# Patient Record
Sex: MALE | Race: WHITE | NOT HISPANIC OR LATINO | URBAN - NONMETROPOLITAN AREA
[De-identification: names, ages, dates, MRNs, and addresses within clinical notes are randomized per-mention and may not be internally consistent; named-entity substitution may affect disease eponyms.]

---

## 2019-06-27 ENCOUNTER — OFFICE (OUTPATIENT)
Dept: URBAN - NONMETROPOLITAN AREA CLINIC 13 | Facility: CLINIC | Age: 59
End: 2019-06-27
Payer: COMMERCIAL

## 2019-06-27 VITALS
DIASTOLIC BLOOD PRESSURE: 81 MMHG | SYSTOLIC BLOOD PRESSURE: 130 MMHG | HEART RATE: 56 BPM | HEIGHT: 76 IN | WEIGHT: 147 LBS | OXYGEN SATURATION: 98 %

## 2019-06-27 VITALS — HEIGHT: 76 IN

## 2019-06-27 DIAGNOSIS — R13.10 DYSPHAGIA, UNSPECIFIED: ICD-10-CM

## 2019-06-27 DIAGNOSIS — R10.13 EPIGASTRIC PAIN: ICD-10-CM

## 2019-06-27 DIAGNOSIS — R19.7 DIARRHEA, UNSPECIFIED: ICD-10-CM

## 2019-06-27 DIAGNOSIS — Z85.038 PERSONAL HISTORY OF OTHER MALIGNANT NEOPLASM OF LARGE INTEST: ICD-10-CM

## 2019-06-27 LAB
AMYLASE: 70 U/L (ref 25–125)
CBC: HCT: 47 % (ref 37.5–51)
CBC: HGB: 16.1 G/DL (ref 13–17.7)
CBC: LYMPH. ABSOLUTE: 1.9 10 (ref 1.3–2.9)
CBC: LYMPHOCYTES: 24.6 % (ref 20.5–40.5)
CBC: MCH: 34.3 PG — HIGH (ref 26.6–33)
CBC: MCHC: 34.3 G/DL (ref 31.5–35.7)
CBC: MCV: 100 FL — HIGH (ref 79–97)
CBC: MONO. ABSOLUTE: 0.8 10 (ref 0.3–3.8)
CBC: MONOCYTES: 9.9 % — HIGH (ref 0.3–3.8)
CBC: NEUT. ABSOLUTE: 4.9 10 — HIGH (ref 2.2–4.8)
CBC: NEUTROPHILS: 65.5 % (ref 43–67)
CBC: PLATELET: 161 10 (ref 130–440)
CBC: RBC: 4.7 10 (ref 4.14–5.8)
CBC: RDW: 13.6 % (ref 11.5–14.5)
CBC: WBC: 7.6 10 (ref 3.4–10.8)
CMP: ALBUMIN: 4.8 G/DL (ref 3.5–5.5)
CMP: ALK.PHOS.: 83 U/L (ref 33–130)
CMP: ALT: 10 U/L (ref 9–55)
CMP: AST: 18 U/L (ref 9–40)
CMP: BUN: 20 MG/DL (ref 7–26)
CMP: CALCIUM: 10 MG/DL (ref 8.4–10.3)
CMP: CHLORIDE: 102 MMOL/L (ref 96–106)
CMP: CO2: 31 MEQ/L — HIGH (ref 20–29)
CMP: CREATININE: 1.02 MG/DL (ref 0.57–1.25)
CMP: GFR AFR. AMER.: 96.1 ML/MIN/1.73 (ref 60–?)
CMP: GFR NON AFR. AMER.: 79.5 ML/MIN/1.73 (ref 60–?)
CMP: GLUCOSE: 104 MG/DL — HIGH (ref 65–99)
CMP: POTASSIUM: 5.2 MMOL/L (ref 3.5–5.3)
CMP: SODIUM: 141 MMOL/L (ref 134–144)
CMP: TOTAL BILIRUBIN: 0.6 MG/DL (ref 0.3–1.2)
CMP: TOTAL PROTEIN: 7.5 G/DL (ref 6–8.5)
LIPASE: 27 U/L (ref 8–78)

## 2019-06-27 PROCEDURE — 83690 ASSAY OF LIPASE: CPT | Performed by: NURSE PRACTITIONER

## 2019-06-27 PROCEDURE — 82150 ASSAY OF AMYLASE: CPT | Performed by: NURSE PRACTITIONER

## 2019-06-27 PROCEDURE — 85025 COMPLETE CBC W/AUTO DIFF WBC: CPT | Performed by: NURSE PRACTITIONER

## 2019-06-27 PROCEDURE — 99205 OFFICE O/P NEW HI 60 MIN: CPT | Performed by: NURSE PRACTITIONER

## 2019-06-27 PROCEDURE — 80053 COMPREHEN METABOLIC PANEL: CPT | Performed by: NURSE PRACTITIONER

## 2019-06-27 RX ORDER — POLYETHYLENE GLYCOL 3350, SODIUM SULFATE ANHYDROUS, SODIUM BICARBONATE, SODIUM CHLORIDE, POTASSIUM CHLORIDE 236; 22.74; 6.74; 5.86; 2.97 G/4L; G/4L; G/4L; G/4L; G/4L
POWDER, FOR SOLUTION ORAL
Qty: 1 | Refills: 0 | Status: COMPLETED
Start: 2019-06-27 | End: 2019-07-01

## 2019-06-27 RX ORDER — ONDANSETRON HYDROCHLORIDE 4 MG/1
TABLET, FILM COATED ORAL
Qty: 2 | Refills: 0 | Status: COMPLETED
Start: 2019-06-27 | End: 2019-07-01

## 2019-06-27 RX ORDER — BISACODYL 5 MG
TABLET, DELAYED RELEASE (ENTERIC COATED) ORAL
Qty: 8 | Refills: 0 | Status: COMPLETED
Start: 2019-06-27 | End: 2019-07-01

## 2019-06-28 ENCOUNTER — AMBULATORY SURGICAL CENTER (OUTPATIENT)
Dept: URBAN - NONMETROPOLITAN AREA SURGERY 2 | Facility: SURGERY | Age: 59
End: 2019-06-28
Payer: COMMERCIAL

## 2019-06-28 ENCOUNTER — OFFICE (OUTPATIENT)
Dept: URBAN - NONMETROPOLITAN AREA CLINIC 13 | Facility: CLINIC | Age: 59
End: 2019-06-28
Payer: COMMERCIAL

## 2019-06-28 VITALS
SYSTOLIC BLOOD PRESSURE: 145 MMHG | HEART RATE: 49 BPM | DIASTOLIC BLOOD PRESSURE: 91 MMHG | OXYGEN SATURATION: 99 % | HEART RATE: 61 BPM | RESPIRATION RATE: 18 BRPM | HEART RATE: 59 BPM | DIASTOLIC BLOOD PRESSURE: 94 MMHG | DIASTOLIC BLOOD PRESSURE: 78 MMHG | DIASTOLIC BLOOD PRESSURE: 84 MMHG | HEART RATE: 58 BPM | OXYGEN SATURATION: 100 % | HEIGHT: 76 IN | WEIGHT: 147 LBS | OXYGEN SATURATION: 97 % | SYSTOLIC BLOOD PRESSURE: 148 MMHG | SYSTOLIC BLOOD PRESSURE: 131 MMHG | DIASTOLIC BLOOD PRESSURE: 77 MMHG | RESPIRATION RATE: 20 BRPM | HEART RATE: 54 BPM | SYSTOLIC BLOOD PRESSURE: 137 MMHG | DIASTOLIC BLOOD PRESSURE: 93 MMHG | DIASTOLIC BLOOD PRESSURE: 86 MMHG | SYSTOLIC BLOOD PRESSURE: 135 MMHG | SYSTOLIC BLOOD PRESSURE: 139 MMHG | HEART RATE: 63 BPM | DIASTOLIC BLOOD PRESSURE: 98 MMHG

## 2019-06-28 DIAGNOSIS — R13.10 DYSPHAGIA, UNSPECIFIED: ICD-10-CM

## 2019-06-28 DIAGNOSIS — K21.9 GASTRO-ESOPHAGEAL REFLUX DISEASE WITHOUT ESOPHAGITIS: ICD-10-CM

## 2019-06-28 DIAGNOSIS — K31.89 OTHER DISEASES OF STOMACH AND DUODENUM: ICD-10-CM

## 2019-06-28 DIAGNOSIS — K30 FUNCTIONAL DYSPEPSIA: ICD-10-CM

## 2019-06-28 DIAGNOSIS — K29.70 GASTRITIS, UNSPECIFIED, WITHOUT BLEEDING: ICD-10-CM

## 2019-06-28 DIAGNOSIS — K31.9 DISEASE OF STOMACH AND DUODENUM, UNSPECIFIED: ICD-10-CM

## 2019-06-28 PROCEDURE — 43239 EGD BIOPSY SINGLE/MULTIPLE: CPT | Mod: 59 | Performed by: INTERNAL MEDICINE

## 2019-06-28 PROCEDURE — 00731 ANES UPR GI NDSC PX NOS: CPT | Mod: QZ,QS

## 2019-06-28 PROCEDURE — 88305 TISSUE EXAM BY PATHOLOGIST: CPT | Mod: TC | Performed by: INTERNAL MEDICINE

## 2019-06-28 PROCEDURE — 00731 ANES UPR GI NDSC PX NOS: CPT | Mod: QS,QZ

## 2019-06-28 PROCEDURE — 43248 EGD GUIDE WIRE INSERTION: CPT | Performed by: INTERNAL MEDICINE

## 2019-06-28 RX ORDER — OMEPRAZOLE 40 MG/1
CAPSULE, DELAYED RELEASE PELLETS ORAL
Qty: 90 | Refills: 1 | Status: COMPLETED
Start: 2019-06-28 | End: 2019-08-30

## 2019-07-01 ENCOUNTER — OFFICE (OUTPATIENT)
Dept: URBAN - NONMETROPOLITAN AREA CLINIC 13 | Facility: CLINIC | Age: 59
End: 2019-07-01
Payer: COMMERCIAL

## 2019-07-01 ENCOUNTER — AMBULATORY SURGICAL CENTER (OUTPATIENT)
Dept: URBAN - NONMETROPOLITAN AREA SURGERY 2 | Facility: SURGERY | Age: 59
End: 2019-07-01
Payer: COMMERCIAL

## 2019-07-01 VITALS
DIASTOLIC BLOOD PRESSURE: 64 MMHG | RESPIRATION RATE: 16 BRPM | OXYGEN SATURATION: 100 % | OXYGEN SATURATION: 94 % | OXYGEN SATURATION: 97 % | WEIGHT: 147 LBS | HEART RATE: 65 BPM | SYSTOLIC BLOOD PRESSURE: 89 MMHG | HEART RATE: 64 BPM | DIASTOLIC BLOOD PRESSURE: 61 MMHG | SYSTOLIC BLOOD PRESSURE: 122 MMHG | SYSTOLIC BLOOD PRESSURE: 141 MMHG | SYSTOLIC BLOOD PRESSURE: 112 MMHG | HEART RATE: 78 BPM | DIASTOLIC BLOOD PRESSURE: 81 MMHG | SYSTOLIC BLOOD PRESSURE: 98 MMHG | DIASTOLIC BLOOD PRESSURE: 80 MMHG | HEART RATE: 69 BPM | DIASTOLIC BLOOD PRESSURE: 83 MMHG | OXYGEN SATURATION: 96 % | RESPIRATION RATE: 18 BRPM | SYSTOLIC BLOOD PRESSURE: 87 MMHG | OXYGEN SATURATION: 95 % | HEART RATE: 79 BPM | HEIGHT: 76 IN | HEART RATE: 76 BPM

## 2019-07-01 VITALS — HEIGHT: 76 IN

## 2019-07-01 DIAGNOSIS — Z85.038 PERSONAL HISTORY OF OTHER MALIGNANT NEOPLASM OF LARGE INTEST: ICD-10-CM

## 2019-07-01 DIAGNOSIS — R97.0 ELEVATED CARCINOEMBRYONIC ANTIGEN [CEA]: ICD-10-CM

## 2019-07-01 PROBLEM — R10.32: Status: ACTIVE | Noted: 2019-07-01

## 2019-07-01 PROCEDURE — 74177 CT ABD & PELVIS W/CONTRAST: CPT | Mod: TC | Performed by: INTERNAL MEDICINE

## 2019-07-01 PROCEDURE — 71260 CT THORAX DX C+: CPT | Mod: TC | Performed by: INTERNAL MEDICINE

## 2019-07-01 PROCEDURE — G0105 COLORECTAL SCRN; HI RISK IND: HCPCS | Performed by: INTERNAL MEDICINE

## 2019-07-02 LAB — SURGICAL PROCEDURE: PDFREPORT1: (no result)

## 2019-08-30 ENCOUNTER — OFFICE (OUTPATIENT)
Dept: URBAN - NONMETROPOLITAN AREA CLINIC 13 | Facility: CLINIC | Age: 59
End: 2019-08-30
Payer: COMMERCIAL

## 2019-08-30 VITALS
SYSTOLIC BLOOD PRESSURE: 170 MMHG | RESPIRATION RATE: 18 BRPM | DIASTOLIC BLOOD PRESSURE: 112 MMHG | HEIGHT: 76 IN | WEIGHT: 141 LBS | HEART RATE: 72 BPM | SYSTOLIC BLOOD PRESSURE: 157 MMHG | OXYGEN SATURATION: 98 % | DIASTOLIC BLOOD PRESSURE: 118 MMHG

## 2019-08-30 VITALS — HEIGHT: 76 IN

## 2019-08-30 DIAGNOSIS — Z85.038 PERSONAL HISTORY OF OTHER MALIGNANT NEOPLASM OF LARGE INTEST: ICD-10-CM

## 2019-08-30 DIAGNOSIS — R10.33 PERIUMBILICAL PAIN: ICD-10-CM

## 2019-08-30 DIAGNOSIS — R11.2 NAUSEA WITH VOMITING, UNSPECIFIED: ICD-10-CM

## 2019-08-30 DIAGNOSIS — R19.7 DIARRHEA, UNSPECIFIED: ICD-10-CM

## 2019-08-30 DIAGNOSIS — R10.9 UNSPECIFIED ABDOMINAL PAIN: ICD-10-CM

## 2019-08-30 LAB
AMYLASE: 65 U/L (ref 25–125)
CBC: HCT: 42.9 % (ref 37.5–51)
CBC: HGB: 15 G/DL (ref 13–17.7)
CBC: LYMPH. ABSOLUTE: 2.2 10 (ref 1.3–2.9)
CBC: LYMPHOCYTES: 22.9 % (ref 20.5–40.5)
CBC: MCH: 34.1 PG — HIGH (ref 26.6–33)
CBC: MCHC: 35 G/DL (ref 31.5–35.7)
CBC: MCV: 97.5 FL — HIGH (ref 79–97)
CBC: MONO. ABSOLUTE: 0.8 10 (ref 0.3–3.8)
CBC: MONOCYTES: 8.4 % — HIGH (ref 0.3–3.8)
CBC: NEUT. ABSOLUTE: 6.7 10 — HIGH (ref 2.2–4.8)
CBC: NEUTROPHILS: 68.7 % — HIGH (ref 43–67)
CBC: PLATELET: 158 10 (ref 130–440)
CBC: RBC: 4.4 10 (ref 4.14–5.8)
CBC: RDW: 12.8 % (ref 11.5–15.4)
CBC: WBC: 9.7 10 (ref 3.4–10.8)
CMP: ALBUMIN: 4.8 G/DL (ref 3.5–5.5)
CMP: ALK.PHOS.: 79 U/L (ref 33–130)
CMP: ALT: 15 U/L (ref 9–55)
CMP: AST: 21 U/L (ref 9–40)
CMP: BUN: 13 MG/DL (ref 7–26)
CMP: CALCIUM: 9.5 MG/DL (ref 8.4–10.3)
CMP: CHLORIDE: 101 MMOL/L (ref 96–106)
CMP: CO2: 26 MEQ/L (ref 20–29)
CMP: CREATININE: 0.82 MG/DL (ref 0.57–1.25)
CMP: GFR AFR. AMER.: 123.7 ML/MIN/1.73 (ref 60–?)
CMP: GFR NON AFR. AMER.: 102.2 ML/MIN/1.73 (ref 60–?)
CMP: GLUCOSE: 104 MG/DL — HIGH (ref 65–99)
CMP: POTASSIUM: 4.1 MMOL/L (ref 3.5–5.3)
CMP: SODIUM: 135 MMOL/L (ref 134–144)
CMP: TOTAL BILIRUBIN: 1.1 MG/DL (ref 0.3–1.2)
CMP: TOTAL PROTEIN: 7.4 G/DL (ref 6–8.5)
LIPASE: 23 U/L (ref 8–78)

## 2019-08-30 PROCEDURE — 83690 ASSAY OF LIPASE: CPT | Performed by: NURSE PRACTITIONER

## 2019-08-30 PROCEDURE — 76700 US EXAM ABDOM COMPLETE: CPT | Mod: TC,59 | Performed by: NURSE PRACTITIONER

## 2019-08-30 PROCEDURE — 82150 ASSAY OF AMYLASE: CPT | Performed by: NURSE PRACTITIONER

## 2019-08-30 PROCEDURE — 85025 COMPLETE CBC W/AUTO DIFF WBC: CPT | Performed by: NURSE PRACTITIONER

## 2019-08-30 PROCEDURE — 36415 COLL VENOUS BLD VENIPUNCTURE: CPT | Performed by: NURSE PRACTITIONER

## 2019-08-30 PROCEDURE — 76700 US EXAM ABDOM COMPLETE: CPT | Mod: 59,TC | Performed by: NURSE PRACTITIONER

## 2019-08-30 PROCEDURE — 80053 COMPREHEN METABOLIC PANEL: CPT | Performed by: NURSE PRACTITIONER

## 2019-08-30 PROCEDURE — 93976 VASCULAR STUDY: CPT | Mod: TC | Performed by: NURSE PRACTITIONER

## 2019-08-30 PROCEDURE — 99213 OFFICE O/P EST LOW 20 MIN: CPT | Mod: 25 | Performed by: NURSE PRACTITIONER

## 2019-08-30 RX ORDER — DICYCLOMINE HYDROCHLORIDE 10 MG/1
CAPSULE ORAL
Qty: 60 | Refills: 0 | Status: COMPLETED
Start: 2019-08-30 | End: 2020-06-30

## 2019-08-30 RX ORDER — RANITIDINE 300 MG/1
TABLET ORAL
Qty: 180 | Refills: 1 | Status: COMPLETED
Start: 2019-08-30 | End: 2019-09-25

## 2019-09-25 ENCOUNTER — OFFICE (OUTPATIENT)
Dept: URBAN - NONMETROPOLITAN AREA CLINIC 13 | Facility: CLINIC | Age: 59
End: 2019-09-25
Payer: COMMERCIAL

## 2019-09-25 VITALS
SYSTOLIC BLOOD PRESSURE: 158 MMHG | DIASTOLIC BLOOD PRESSURE: 95 MMHG | WEIGHT: 150 LBS | SYSTOLIC BLOOD PRESSURE: 152 MMHG | HEIGHT: 76 IN | OXYGEN SATURATION: 98 % | DIASTOLIC BLOOD PRESSURE: 106 MMHG | HEART RATE: 68 BPM

## 2019-09-25 DIAGNOSIS — R19.7 DIARRHEA, UNSPECIFIED: ICD-10-CM

## 2019-09-25 DIAGNOSIS — Z85.038 PERSONAL HISTORY OF OTHER MALIGNANT NEOPLASM OF LARGE INTEST: ICD-10-CM

## 2019-09-25 DIAGNOSIS — K21.9 GASTRO-ESOPHAGEAL REFLUX DISEASE WITHOUT ESOPHAGITIS: ICD-10-CM

## 2019-09-25 PROCEDURE — 99213 OFFICE O/P EST LOW 20 MIN: CPT | Performed by: NURSE PRACTITIONER

## 2019-09-25 RX ORDER — OMEPRAZOLE 40 MG/1
CAPSULE, DELAYED RELEASE PELLETS ORAL
Qty: 90 | Refills: 1 | Status: COMPLETED
Start: 2019-09-25 | End: 2020-06-30

## 2020-06-30 ENCOUNTER — OFFICE (OUTPATIENT)
Dept: URBAN - NONMETROPOLITAN AREA CLINIC 13 | Facility: CLINIC | Age: 60
End: 2020-06-30
Payer: COMMERCIAL

## 2020-06-30 VITALS
OXYGEN SATURATION: 98 % | DIASTOLIC BLOOD PRESSURE: 66 MMHG | HEART RATE: 76 BPM | HEIGHT: 76 IN | WEIGHT: 145 LBS | SYSTOLIC BLOOD PRESSURE: 98 MMHG

## 2020-06-30 VITALS — HEIGHT: 76 IN

## 2020-06-30 DIAGNOSIS — R13.10 DYSPHAGIA, UNSPECIFIED: ICD-10-CM

## 2020-06-30 DIAGNOSIS — I71.4 ABDOMINAL AORTIC ANEURYSM, WITHOUT RUPTURE: ICD-10-CM

## 2020-06-30 DIAGNOSIS — R10.9 UNSPECIFIED ABDOMINAL PAIN: ICD-10-CM

## 2020-06-30 DIAGNOSIS — Z85.038 PERSONAL HISTORY OF OTHER MALIGNANT NEOPLASM OF LARGE INTEST: ICD-10-CM

## 2020-06-30 DIAGNOSIS — R11.2 NAUSEA WITH VOMITING, UNSPECIFIED: ICD-10-CM

## 2020-06-30 DIAGNOSIS — R10.12 LEFT UPPER QUADRANT PAIN: ICD-10-CM

## 2020-06-30 DIAGNOSIS — R10.33 PERIUMBILICAL PAIN: ICD-10-CM

## 2020-06-30 PROCEDURE — 76700 US EXAM ABDOM COMPLETE: CPT | Mod: TC | Performed by: NURSE PRACTITIONER

## 2020-06-30 PROCEDURE — 99213 OFFICE O/P EST LOW 20 MIN: CPT | Mod: 25 | Performed by: NURSE PRACTITIONER

## 2020-07-01 LAB
AMYLASE: 59 UNITS/L (ref 30–110)
CBC WITH WBC (DIFF): ACANTHOCYTE: NORMAL
CBC WITH WBC (DIFF): AGRANULAR NEUTROPHIL: NORMAL
CBC WITH WBC (DIFF): ANISOCYTOSIS: NORMAL
CBC WITH WBC (DIFF): ATYPICAL LYMPHOCYTE: NORMAL
CBC WITH WBC (DIFF): AUER RODS: NORMAL
CBC WITH WBC (DIFF): BAND: NORMAL
CBC WITH WBC (DIFF): BASOPHIL: 0 % (ref 0–1)
CBC WITH WBC (DIFF): BASOPHILIC STIPPLING: NORMAL
CBC WITH WBC (DIFF): BI-LOBED NEUTROPHIL: NORMAL
CBC WITH WBC (DIFF): BLAST: NORMAL
CBC WITH WBC (DIFF): BURR CELL: NORMAL
CBC WITH WBC (DIFF): DIMORPHIC RBC POPULATION: NORMAL
CBC WITH WBC (DIFF): DOHLE BODIES: NORMAL
CBC WITH WBC (DIFF): ELLIPTOCYTE: NORMAL
CBC WITH WBC (DIFF): EOSINOPHIL: 1 % (ref 0–5)
CBC WITH WBC (DIFF): GIANT PLATELET: NORMAL
CBC WITH WBC (DIFF): HEMATOCRIT: 45.2 % (ref 41–53)
CBC WITH WBC (DIFF): HEMOGLOBIN: 14.7 G/DL (ref 14–18)
CBC WITH WBC (DIFF): HOWELL JOLLY BODIES: NORMAL
CBC WITH WBC (DIFF): HYPERSEGMENTED NEUTROPHIL: NORMAL
CBC WITH WBC (DIFF): HYPOCHROMIA: NORMAL
CBC WITH WBC (DIFF): HYPOGRANULAR NEUTROPHIL: NORMAL
CBC WITH WBC (DIFF): IMMATURE GRANULOCYTES: 0 % (ref 0–1)
CBC WITH WBC (DIFF): LARGE PLATELET: NORMAL
CBC WITH WBC (DIFF): LYMPHOCYTE: 23 % (ref 20–40)
CBC WITH WBC (DIFF): MACROCYTOSIS: NORMAL
CBC WITH WBC (DIFF): MEAN CELL HEMOGLOBIN CONCENTRATION: 32.5 G/DL — LOW (ref 33–37)
CBC WITH WBC (DIFF): MEAN CELL HEMOGLOBIN: 34.1 PG — HIGH (ref 27–31)
CBC WITH WBC (DIFF): MEAN CELL VOLUME: 105 FL — HIGH (ref 84–100)
CBC WITH WBC (DIFF): MEAN PLATELET VOLUME: 11.4 FL (ref 8.3–11.9)
CBC WITH WBC (DIFF): METAMYELOCYTE: NORMAL
CBC WITH WBC (DIFF): MICROCYTOSIS: NORMAL
CBC WITH WBC (DIFF): MIX CELLS: NORMAL
CBC WITH WBC (DIFF): MONOCYTE: 6 % (ref 1–10)
CBC WITH WBC (DIFF): MYELOCYTE: NORMAL
CBC WITH WBC (DIFF): NEUTROPHIL SEGMENTED: 69 % (ref 50–70)
CBC WITH WBC (DIFF): NOTE: NORMAL
CBC WITH WBC (DIFF): NUCLEATED RBC: 0 /100WBC (ref 0–0)
CBC WITH WBC (DIFF): OVALOCYTE: NORMAL
CBC WITH WBC (DIFF): PAPPENHEIMER BODIES: NORMAL
CBC WITH WBC (DIFF): PATHOLOGIST INTERPRETATION: NORMAL
CBC WITH WBC (DIFF): PLATELET CLUMPS: NORMAL
CBC WITH WBC (DIFF): PLATELET COUNT: 174 /NL (ref 140–440)
CBC WITH WBC (DIFF): PLT SATELLITOSIS: NORMAL
CBC WITH WBC (DIFF): POIKILOCYTOSIS: NORMAL
CBC WITH WBC (DIFF): POLYCHROMASIA: NORMAL
CBC WITH WBC (DIFF): PROMYELOCYTE: NORMAL
CBC WITH WBC (DIFF): RBC MORPHOLOGY: NORMAL
CBC WITH WBC (DIFF): REACT LYMPH ABS MAN: NORMAL
CBC WITH WBC (DIFF): REACTIVE LYMPHOCYTE: NORMAL
CBC WITH WBC (DIFF): RED BLOOD CELL: 4.31 /PL — LOW (ref 4.7–6.1)
CBC WITH WBC (DIFF): RED CELL DIAMETER WIDTH: 51 FL — HIGH (ref 35–48)
CBC WITH WBC (DIFF): ROULEAUX RBC: NORMAL
CBC WITH WBC (DIFF): SCHISTOCYTE: NORMAL
CBC WITH WBC (DIFF): SICKLE CELL: NORMAL
CBC WITH WBC (DIFF): SMUDGE CELLS: NORMAL
CBC WITH WBC (DIFF): SPHEROCYTE: NORMAL
CBC WITH WBC (DIFF): STOMATOCYTE: NORMAL
CBC WITH WBC (DIFF): TARGET CELL: NORMAL
CBC WITH WBC (DIFF): TEAR DROP CELL: NORMAL
CBC WITH WBC (DIFF): TOXIC GRANULATION: NORMAL
CBC WITH WBC (DIFF): UNCLASSIFIED CELL: NORMAL
CBC WITH WBC (DIFF): VACUOLATED NEUTROPHIL: NORMAL
CBC WITH WBC (DIFF): WBC MORPHOLOGY: NORMAL
CBC WITH WBC (DIFF): WHITE BLOOD CELL: 10.6 /NL (ref 4.8–11)
CEA: 5.9 NG/ML — HIGH
COMPREHENSIVE METABOLIC PANEL: ALBUMIN: 4.5 G/DL (ref 3.5–4.8)
COMPREHENSIVE METABOLIC PANEL: ALKALINE PHOSPHATASE: 72 UNITS/L (ref 36–126)
COMPREHENSIVE METABOLIC PANEL: ALT: 10 UNITS/L (ref ?–49)
COMPREHENSIVE METABOLIC PANEL: ANION GAP: 7 MMOL/L (ref 7–16)
COMPREHENSIVE METABOLIC PANEL: AST: 24 UNITS/L (ref 17–59)
COMPREHENSIVE METABOLIC PANEL: BILIRUBIN, TOTAL: 0.6 MG/DL (ref 0.2–1.3)
COMPREHENSIVE METABOLIC PANEL: BUN/CREATININE RATIO: 18.3
COMPREHENSIVE METABOLIC PANEL: CALCIUM: 10.1 MG/DL (ref 8.4–10.2)
COMPREHENSIVE METABOLIC PANEL: CARBON DIOXIDE: 27 MMOL/L (ref 22–30)
COMPREHENSIVE METABOLIC PANEL: CHLORIDE: 102 MMOL/L (ref 98–107)
COMPREHENSIVE METABOLIC PANEL: CREATININE: 1.2 MG/DL (ref 0.66–1.25)
COMPREHENSIVE METABOLIC PANEL: GLUCOSE: 106 MG/DL (ref 75–110)
COMPREHENSIVE METABOLIC PANEL: POTASSIUM: 5.4 MMOL/L — HIGH (ref 3.5–5.3)
COMPREHENSIVE METABOLIC PANEL: PROTEIN, TOTAL, SERUM: 7.4 G/DL (ref 6.3–8.2)
COMPREHENSIVE METABOLIC PANEL: SODIUM: 136 MMOL/L — LOW (ref 137–145)
COMPREHENSIVE METABOLIC PANEL: UREA NITROGEN (BUN): 22 MG/DL — HIGH (ref 9–20)
GFR: EGFR AFRICAN AMERICAN: >60 ML/MIN/1.73M2
GFR: EGFR NON-AFR.AMERICAN: >60 ML/MIN/1.73M2
LIPASE: 38 UNITS/L (ref 23–300)

## 2020-07-23 ENCOUNTER — OFFICE (OUTPATIENT)
Dept: URBAN - NONMETROPOLITAN AREA CLINIC 13 | Facility: CLINIC | Age: 60
End: 2020-07-23
Payer: COMMERCIAL

## 2020-07-23 VITALS — HEIGHT: 76 IN

## 2020-07-23 DIAGNOSIS — Z85.038 PERSONAL HISTORY OF OTHER MALIGNANT NEOPLASM OF LARGE INTEST: ICD-10-CM

## 2020-07-23 DIAGNOSIS — R97.0 ELEVATED CARCINOEMBRYONIC ANTIGEN [CEA]: ICD-10-CM

## 2020-07-23 PROCEDURE — 74177 CT ABD & PELVIS W/CONTRAST: CPT | Mod: TC | Performed by: NURSE PRACTITIONER

## 2021-07-27 ENCOUNTER — OFFICE (OUTPATIENT)
Dept: URBAN - NONMETROPOLITAN AREA CLINIC 13 | Facility: CLINIC | Age: 61
End: 2021-07-27
Payer: MEDICAID

## 2021-07-27 VITALS
OXYGEN SATURATION: 97 % | DIASTOLIC BLOOD PRESSURE: 84 MMHG | SYSTOLIC BLOOD PRESSURE: 133 MMHG | HEART RATE: 51 BPM | HEIGHT: 76 IN | WEIGHT: 137 LBS

## 2021-07-27 DIAGNOSIS — R13.10 DYSPHAGIA, UNSPECIFIED: ICD-10-CM

## 2021-07-27 DIAGNOSIS — R10.32 LEFT LOWER QUADRANT PAIN: ICD-10-CM

## 2021-07-27 DIAGNOSIS — Z85.038 PERSONAL HISTORY OF OTHER MALIGNANT NEOPLASM OF LARGE INTEST: ICD-10-CM

## 2021-07-27 DIAGNOSIS — F17.200 NICOTINE DEPENDENCE, UNSPECIFIED, UNCOMPLICATED: ICD-10-CM

## 2021-07-27 DIAGNOSIS — R10.33 PERIUMBILICAL PAIN: ICD-10-CM

## 2021-07-27 DIAGNOSIS — R63.4 ABNORMAL WEIGHT LOSS: ICD-10-CM

## 2021-07-27 LAB
AMYLASE: 72 UNITS/L (ref 30–110)
CBC WITH WBC (DIFF): ACANTHOCYTE: NORMAL
CBC WITH WBC (DIFF): AGRANULAR NEUTROPHIL: NORMAL
CBC WITH WBC (DIFF): ANISOCYTOSIS: NORMAL
CBC WITH WBC (DIFF): ATYPICAL LYMPHOCYTE: NORMAL
CBC WITH WBC (DIFF): AUER RODS: NORMAL
CBC WITH WBC (DIFF): BAND: NORMAL
CBC WITH WBC (DIFF): BASOPHIL: 1 % (ref 0–1)
CBC WITH WBC (DIFF): BASOPHILIC STIPPLING: NORMAL
CBC WITH WBC (DIFF): BI-LOBED NEUTROPHIL: NORMAL
CBC WITH WBC (DIFF): BLAST: NORMAL
CBC WITH WBC (DIFF): BURR CELL: NORMAL
CBC WITH WBC (DIFF): DIMORPHIC RBC POPULATION: NORMAL
CBC WITH WBC (DIFF): DOHLE BODIES: NORMAL
CBC WITH WBC (DIFF): ELLIPTOCYTE: NORMAL
CBC WITH WBC (DIFF): EOSINOPHIL: 4 % (ref 0–5)
CBC WITH WBC (DIFF): GIANT PLATELET: NORMAL
CBC WITH WBC (DIFF): HEMATOCRIT: 44.3 % (ref 41–53)
CBC WITH WBC (DIFF): HEMOGLOBIN: 14.2 G/DL (ref 14–18)
CBC WITH WBC (DIFF): HOWELL JOLLY BODIES: NORMAL
CBC WITH WBC (DIFF): HYPERSEGMENTED NEUTROPHIL: NORMAL
CBC WITH WBC (DIFF): HYPOCHROMIA: NORMAL
CBC WITH WBC (DIFF): HYPOGRANULAR NEUTROPHIL: NORMAL
CBC WITH WBC (DIFF): IMMATURE GRANULOCYTES: 0 % (ref 0–1)
CBC WITH WBC (DIFF): LARGE PLATELET: NORMAL
CBC WITH WBC (DIFF): LYMPHOCYTE: 34 % (ref 20–40)
CBC WITH WBC (DIFF): MACROCYTOSIS: NORMAL
CBC WITH WBC (DIFF): MEAN CELL HEMOGLOBIN CONCENTRATION: 32.1 G/DL — LOW (ref 33–37)
CBC WITH WBC (DIFF): MEAN CELL HEMOGLOBIN: 34.3 PG — HIGH (ref 27–31)
CBC WITH WBC (DIFF): MEAN CELL VOLUME: 107 FL — HIGH (ref 84–100)
CBC WITH WBC (DIFF): MEAN PLATELET VOLUME: 11.4 FL (ref 8.3–11.9)
CBC WITH WBC (DIFF): METAMYELOCYTE: NORMAL
CBC WITH WBC (DIFF): MICROCYTOSIS: NORMAL
CBC WITH WBC (DIFF): MIX CELLS: NORMAL
CBC WITH WBC (DIFF): MONOCYTE: 7 % (ref 1–10)
CBC WITH WBC (DIFF): MYELOCYTE: NORMAL
CBC WITH WBC (DIFF): NEUTROPHIL SEGMENTED: 54 % (ref 50–70)
CBC WITH WBC (DIFF): NOTE: NORMAL
CBC WITH WBC (DIFF): NUCLEATED RBC: 0 /100WBC (ref 0–0)
CBC WITH WBC (DIFF): OVALOCYTE: NORMAL
CBC WITH WBC (DIFF): PAPPENHEIMER BODIES: NORMAL
CBC WITH WBC (DIFF): PATHOLOGIST INTERPRETATION: NORMAL
CBC WITH WBC (DIFF): PLATELET CLUMPS: NORMAL
CBC WITH WBC (DIFF): PLATELET COUNT: 164 /NL (ref 140–440)
CBC WITH WBC (DIFF): PLT SATELLITOSIS: NORMAL
CBC WITH WBC (DIFF): POIKILOCYTOSIS: NORMAL
CBC WITH WBC (DIFF): POLYCHROMASIA: NORMAL
CBC WITH WBC (DIFF): PROMYELOCYTE: NORMAL
CBC WITH WBC (DIFF): RBC MORPHOLOGY: NORMAL
CBC WITH WBC (DIFF): REACT LYMPH ABS MAN: NORMAL
CBC WITH WBC (DIFF): REACTIVE LYMPHOCYTE: NORMAL
CBC WITH WBC (DIFF): RED BLOOD CELL: 4.14 /PL — LOW (ref 4.7–6.1)
CBC WITH WBC (DIFF): RED CELL DIAMETER WIDTH: 53 FL — HIGH (ref 35–48)
CBC WITH WBC (DIFF): ROULEAUX RBC: NORMAL
CBC WITH WBC (DIFF): SCHISTOCYTE: NORMAL
CBC WITH WBC (DIFF): SICKLE CELL: NORMAL
CBC WITH WBC (DIFF): SMUDGE CELLS: NORMAL
CBC WITH WBC (DIFF): SPHEROCYTE: NORMAL
CBC WITH WBC (DIFF): STOMATOCYTE: NORMAL
CBC WITH WBC (DIFF): TARGET CELL: NORMAL
CBC WITH WBC (DIFF): TEAR DROP CELL: NORMAL
CBC WITH WBC (DIFF): TOXIC GRANULATION: NORMAL
CBC WITH WBC (DIFF): UNCLASSIFIED CELL: NORMAL
CBC WITH WBC (DIFF): VACUOLATED NEUTROPHIL: NORMAL
CBC WITH WBC (DIFF): WBC MORPHOLOGY: NORMAL
CBC WITH WBC (DIFF): WHITE BLOOD CELL: 9.7 /NL (ref 4.8–11)
COMPREHENSIVE METABOLIC PANEL: ALBUMIN: 4.5 G/DL (ref 3.5–4.8)
COMPREHENSIVE METABOLIC PANEL: ALKALINE PHOSPHATASE: 60 UNITS/L (ref 36–126)
COMPREHENSIVE METABOLIC PANEL: ALT: 13 UNITS/L (ref ?–49)
COMPREHENSIVE METABOLIC PANEL: ANION GAP: 14 MMOL/L (ref 7–16)
COMPREHENSIVE METABOLIC PANEL: AST: 25 UNITS/L (ref 17–59)
COMPREHENSIVE METABOLIC PANEL: BILIRUBIN, TOTAL: 0.3 MG/DL (ref 0.2–1.3)
COMPREHENSIVE METABOLIC PANEL: BUN/CREATININE RATIO: 21.1
COMPREHENSIVE METABOLIC PANEL: CALCIUM: 9.8 MG/DL (ref 8.4–10.2)
COMPREHENSIVE METABOLIC PANEL: CARBON DIOXIDE: 26 MMOL/L (ref 22–30)
COMPREHENSIVE METABOLIC PANEL: CHLORIDE: 100 MMOL/L (ref 98–107)
COMPREHENSIVE METABOLIC PANEL: CREATININE: 1.09 MG/DL (ref 0.66–1.25)
COMPREHENSIVE METABOLIC PANEL: GLUCOSE: 80 MG/DL (ref 75–110)
COMPREHENSIVE METABOLIC PANEL: POTASSIUM: 5.1 MMOL/L (ref 3.5–5.3)
COMPREHENSIVE METABOLIC PANEL: PROTEIN, TOTAL, SERUM: 7 G/DL (ref 6.3–8.2)
COMPREHENSIVE METABOLIC PANEL: SODIUM: 140 MMOL/L (ref 137–145)
COMPREHENSIVE METABOLIC PANEL: UREA NITROGEN (BUN): 23 MG/DL — HIGH (ref 9–20)
GFR: EGFR AFRICAN AMERICAN: >60 ML/MIN/1.73M2
GFR: EGFR NON-AFR.AMERICAN: >60 ML/MIN/1.73M2
LIPASE: 75 UNITS/L (ref 23–300)
TSH: 0.74 MICRO-INTL UNIT (ref 0.47–4.68)

## 2021-07-27 PROCEDURE — 99214 OFFICE O/P EST MOD 30 MIN: CPT | Performed by: NURSE PRACTITIONER

## 2021-07-28 LAB — CEA: 5.3 NG/ML — HIGH

## 2021-08-02 ENCOUNTER — OFFICE (OUTPATIENT)
Dept: URBAN - NONMETROPOLITAN AREA CLINIC 13 | Facility: CLINIC | Age: 61
End: 2021-08-02
Payer: MEDICAID

## 2021-08-02 ENCOUNTER — AMBULATORY SURGICAL CENTER (OUTPATIENT)
Dept: URBAN - NONMETROPOLITAN AREA SURGERY 2 | Facility: SURGERY | Age: 61
End: 2021-08-02
Payer: MEDICAID

## 2021-08-02 ENCOUNTER — AMBULATORY SURGICAL CENTER (OUTPATIENT)
Dept: URBAN - NONMETROPOLITAN AREA SURGERY 2 | Facility: SURGERY | Age: 61
End: 2021-08-02

## 2021-08-02 VITALS
SYSTOLIC BLOOD PRESSURE: 94 MMHG | WEIGHT: 137 LBS | RESPIRATION RATE: 15 BRPM | HEART RATE: 57 BPM | HEART RATE: 54 BPM | HEART RATE: 50 BPM | OXYGEN SATURATION: 100 % | DIASTOLIC BLOOD PRESSURE: 85 MMHG | DIASTOLIC BLOOD PRESSURE: 109 MMHG | RESPIRATION RATE: 11 BRPM | RESPIRATION RATE: 12 BRPM | HEART RATE: 69 BPM | HEART RATE: 41 BPM | OXYGEN SATURATION: 96 % | RESPIRATION RATE: 16 BRPM | SYSTOLIC BLOOD PRESSURE: 153 MMHG | SYSTOLIC BLOOD PRESSURE: 97 MMHG | SYSTOLIC BLOOD PRESSURE: 179 MMHG | DIASTOLIC BLOOD PRESSURE: 71 MMHG | SYSTOLIC BLOOD PRESSURE: 160 MMHG | DIASTOLIC BLOOD PRESSURE: 68 MMHG | RESPIRATION RATE: 14 BRPM | DIASTOLIC BLOOD PRESSURE: 86 MMHG | HEART RATE: 61 BPM | HEIGHT: 76 IN | HEART RATE: 55 BPM | HEART RATE: 48 BPM | DIASTOLIC BLOOD PRESSURE: 72 MMHG | DIASTOLIC BLOOD PRESSURE: 91 MMHG | SYSTOLIC BLOOD PRESSURE: 92 MMHG | SYSTOLIC BLOOD PRESSURE: 96 MMHG | DIASTOLIC BLOOD PRESSURE: 60 MMHG | DIASTOLIC BLOOD PRESSURE: 66 MMHG | SYSTOLIC BLOOD PRESSURE: 155 MMHG | DIASTOLIC BLOOD PRESSURE: 94 MMHG | HEART RATE: 56 BPM | TEMPERATURE: 98.3 F | HEART RATE: 62 BPM | SYSTOLIC BLOOD PRESSURE: 165 MMHG | SYSTOLIC BLOOD PRESSURE: 120 MMHG

## 2021-08-02 VITALS — HEIGHT: 76 IN

## 2021-08-02 DIAGNOSIS — R13.19 OTHER DYSPHAGIA: ICD-10-CM

## 2021-08-02 DIAGNOSIS — R10.33 PERIUMBILICAL PAIN: ICD-10-CM

## 2021-08-02 DIAGNOSIS — K31.89 OTHER DISEASES OF STOMACH AND DUODENUM: ICD-10-CM

## 2021-08-02 DIAGNOSIS — K31.9 DISEASE OF STOMACH AND DUODENUM, UNSPECIFIED: ICD-10-CM

## 2021-08-02 DIAGNOSIS — F17.200 NICOTINE DEPENDENCE, UNSPECIFIED, UNCOMPLICATED: ICD-10-CM

## 2021-08-02 DIAGNOSIS — R63.4 ABNORMAL WEIGHT LOSS: ICD-10-CM

## 2021-08-02 DIAGNOSIS — R10.32 LEFT LOWER QUADRANT PAIN: ICD-10-CM

## 2021-08-02 PROCEDURE — 88312 SPECIAL STAINS GROUP 1: CPT | Mod: TC | Performed by: INTERNAL MEDICINE

## 2021-08-02 PROCEDURE — 43239 EGD BIOPSY SINGLE/MULTIPLE: CPT | Performed by: INTERNAL MEDICINE

## 2021-08-02 PROCEDURE — 88305 TISSUE EXAM BY PATHOLOGIST: CPT | Mod: TC | Performed by: INTERNAL MEDICINE

## 2021-08-02 PROCEDURE — 74177 CT ABD & PELVIS W/CONTRAST: CPT | Performed by: INTERNAL MEDICINE

## 2021-08-02 PROCEDURE — 71260 CT THORAX DX C+: CPT | Performed by: INTERNAL MEDICINE

## 2021-08-02 PROCEDURE — 00731 ANES UPR GI NDSC PX NOS: CPT | Mod: QS,QZ

## 2021-08-02 PROCEDURE — 88313 SPECIAL STAINS GROUP 2: CPT | Mod: TC | Performed by: INTERNAL MEDICINE

## 2021-08-02 RX ORDER — FAMOTIDINE 40 MG/1
TABLET, FILM COATED ORAL
Qty: 180 | Refills: 1 | Status: ACTIVE
Start: 2021-08-02

## 2021-08-02 RX ORDER — OMEPRAZOLE 40 MG/1
CAPSULE, DELAYED RELEASE ORAL
Qty: 90 | Refills: 1 | Status: ACTIVE
Start: 2021-08-02

## 2021-08-02 RX ORDER — DIPHENOXYLATE HYDROCHLORIDE AND ATROPINE SULFATE 2.5; .025 MG/1; MG/1
TABLET ORAL
Qty: 30 | Refills: 0 | Status: COMPLETED
End: 2021-08-02

## 2021-08-02 RX ORDER — POLYETHYLENE GLYCOL 3350 17 G/17G
POWDER, FOR SOLUTION ORAL
Qty: 3060 | Refills: 2 | Status: ACTIVE
Start: 2021-08-02

## 2021-08-04 LAB
PATHOLOGY REPORT: APSREPORT: (no result) 1
PATHOLOGY REPORT: PDF: (no result) 1

## 2022-09-22 ENCOUNTER — OFFICE (OUTPATIENT)
Dept: URBAN - NONMETROPOLITAN AREA CLINIC 13 | Facility: CLINIC | Age: 62
End: 2022-09-22
Payer: COMMERCIAL

## 2022-09-22 VITALS
SYSTOLIC BLOOD PRESSURE: 178 MMHG | SYSTOLIC BLOOD PRESSURE: 161 MMHG | DIASTOLIC BLOOD PRESSURE: 105 MMHG | OXYGEN SATURATION: 97 % | DIASTOLIC BLOOD PRESSURE: 106 MMHG | HEART RATE: 88 BPM | WEIGHT: 145 LBS | HEIGHT: 76 IN

## 2022-09-22 DIAGNOSIS — K21.9 GASTRO-ESOPHAGEAL REFLUX DISEASE WITHOUT ESOPHAGITIS: ICD-10-CM

## 2022-09-22 DIAGNOSIS — Z85.038 PERSONAL HISTORY OF OTHER MALIGNANT NEOPLASM OF LARGE INTEST: ICD-10-CM

## 2022-09-22 DIAGNOSIS — R13.10 DYSPHAGIA, UNSPECIFIED: ICD-10-CM

## 2022-09-22 PROCEDURE — 99213 OFFICE O/P EST LOW 20 MIN: CPT | Performed by: NURSE PRACTITIONER

## 2022-10-17 PROBLEM — K21.9 GERD: Status: ACTIVE | Noted: 2019-06-28

## 2023-06-02 ENCOUNTER — OFFICE (OUTPATIENT)
Dept: URBAN - NONMETROPOLITAN AREA CLINIC 13 | Facility: CLINIC | Age: 63
End: 2023-06-02
Payer: MEDICAID

## 2023-06-02 ENCOUNTER — OFFICE (OUTPATIENT)
Dept: URBAN - NONMETROPOLITAN AREA CLINIC 13 | Facility: CLINIC | Age: 63
End: 2023-06-02
Payer: COMMERCIAL

## 2023-06-02 VITALS
HEIGHT: 76 IN | DIASTOLIC BLOOD PRESSURE: 92 MMHG | SYSTOLIC BLOOD PRESSURE: 130 MMHG | OXYGEN SATURATION: 97 % | DIASTOLIC BLOOD PRESSURE: 103 MMHG | HEART RATE: 76 BPM | SYSTOLIC BLOOD PRESSURE: 139 MMHG

## 2023-06-02 VITALS — HEIGHT: 76 IN

## 2023-06-02 DIAGNOSIS — I71.40 ABDOMINAL AORTIC ANEURYSM, WITHOUT RUPTURE, UNSPECIFIED: ICD-10-CM

## 2023-06-02 DIAGNOSIS — R13.10 DYSPHAGIA, UNSPECIFIED: ICD-10-CM

## 2023-06-02 DIAGNOSIS — Z85.038 PERSONAL HISTORY OF OTHER MALIGNANT NEOPLASM OF LARGE INTEST: ICD-10-CM

## 2023-06-02 DIAGNOSIS — K76.0 FATTY (CHANGE OF) LIVER, NOT ELSEWHERE CLASSIFIED: ICD-10-CM

## 2023-06-02 PROBLEM — Z86.010 PERSONAL HISTORY OF COLON POLYPS: Status: ACTIVE | Noted: 2019-07-01

## 2023-06-02 PROCEDURE — 99213 OFFICE O/P EST LOW 20 MIN: CPT | Mod: 25 | Performed by: NURSE PRACTITIONER

## 2023-06-02 PROCEDURE — 76700 US EXAM ABDOM COMPLETE: CPT | Mod: TC | Performed by: NURSE PRACTITIONER

## 2024-09-27 PROBLEM — K21.9 GERD: Status: ACTIVE | Noted: 2019-06-28

## 2024-10-03 PROBLEM — K62.1 RECTAL POLYP: Status: ACTIVE | Noted: 2024-10-03

## 2024-10-03 PROBLEM — K63.5 POLYP OF COLON: Status: ACTIVE | Noted: 2024-10-03

## 2025-03-28 ENCOUNTER — OFFICE (OUTPATIENT)
Dept: URBAN - NONMETROPOLITAN AREA CLINIC 1 | Facility: CLINIC | Age: 65
End: 2025-03-28
Payer: MEDICARE

## 2025-03-28 VITALS
DIASTOLIC BLOOD PRESSURE: 83 MMHG | HEART RATE: 68 BPM | SYSTOLIC BLOOD PRESSURE: 162 MMHG | SYSTOLIC BLOOD PRESSURE: 143 MMHG | DIASTOLIC BLOOD PRESSURE: 90 MMHG | OXYGEN SATURATION: 96 % | WEIGHT: 167 LBS | HEIGHT: 76 IN

## 2025-03-28 DIAGNOSIS — Z08 ENCOUNTER FOR FOLLOW-UP EXAMINATION AFTER COMPLETED TREATMEN: ICD-10-CM

## 2025-03-28 DIAGNOSIS — R13.10 DYSPHAGIA, UNSPECIFIED: ICD-10-CM

## 2025-03-28 DIAGNOSIS — Z85.038 PERSONAL HISTORY OF OTHER MALIGNANT NEOPLASM OF LARGE INTEST: ICD-10-CM

## 2025-03-28 PROCEDURE — 99214 OFFICE O/P EST MOD 30 MIN: CPT | Performed by: NURSE PRACTITIONER

## 2025-03-28 NOTE — SERVICEHPINOTES
br   Mr. Ledbetter is a 65 y/o male seen today for a follow up visit. Since last office visit he had a colonoscopy 10/3/2024. Colonoscopy by Dr. Prado- HP x 1, TA x 1 (3 mm), Anastomosis identified at 15 cm. This is well healed and widely patent. Remainder of the colon otherwise appeared normal with recommendation to repeat colonoscopy in 5 years. He reports one normal bowel movement daily. Denies dark stools and hematochezia. 
br
br He presents alone with complaints of cervical dysphagia x 6 months that is worse with solids and pills. Denies gerd, n/v. Last EGD 5/27/23 by Dr. 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 Small hiatal hernia, Z line at 37cm. No evidence for pharyngeal or esophageal foreign body. He reports he had good relief after dilation.

## 2025-07-23 LAB — EGD: TEXT: (no result)
